# Patient Record
Sex: FEMALE | Race: WHITE | NOT HISPANIC OR LATINO
[De-identification: names, ages, dates, MRNs, and addresses within clinical notes are randomized per-mention and may not be internally consistent; named-entity substitution may affect disease eponyms.]

---

## 2017-11-09 ENCOUNTER — CHARTING TRANS (OUTPATIENT)
Dept: OTHER | Age: 37
End: 2017-11-09

## 2018-11-02 VITALS
SYSTOLIC BLOOD PRESSURE: 100 MMHG | HEART RATE: 78 BPM | WEIGHT: 160.06 LBS | BODY MASS INDEX: 26.67 KG/M2 | HEIGHT: 65 IN | DIASTOLIC BLOOD PRESSURE: 60 MMHG

## 2018-12-10 RX ORDER — DROSPIRENONE AND ETHINYL ESTRADIOL 0.03MG-3MG
KIT ORAL
Qty: 90 TABLET | Refills: 0 | Status: SHIPPED | OUTPATIENT
Start: 2018-12-10

## 2024-05-13 PROCEDURE — 88305 TISSUE EXAM BY PATHOLOGIST: CPT | Performed by: CLINICAL MEDICAL LABORATORY

## 2024-05-15 ENCOUNTER — LAB REQUISITION (OUTPATIENT)
Dept: LAB | Age: 44
End: 2024-05-15

## 2024-05-15 DIAGNOSIS — N84.0 POLYP OF CORPUS UTERI: ICD-10-CM

## 2024-05-16 LAB
ASR DISCLAIMER: NORMAL
CASE RPRT: NORMAL
CLINICAL INFO: NORMAL
PATH REPORT.FINAL DX SPEC: NORMAL
PATH REPORT.GROSS SPEC: NORMAL

## 2025-01-22 ENCOUNTER — HOSPITAL ENCOUNTER (OUTPATIENT)
Age: 45
Discharge: HOME | End: 2025-01-22
Attending: STUDENT IN AN ORGANIZED HEALTH CARE EDUCATION/TRAINING PROGRAM
Payer: COMMERCIAL

## 2025-01-22 VITALS
SYSTOLIC BLOOD PRESSURE: 130 MMHG | RESPIRATION RATE: 20 BRPM | OXYGEN SATURATION: 97 % | TEMPERATURE: 98 F | HEART RATE: 82 BPM | DIASTOLIC BLOOD PRESSURE: 86 MMHG

## 2025-01-22 DIAGNOSIS — J06.9 VIRAL URI WITH COUGH: Primary | ICD-10-CM

## 2025-01-22 DIAGNOSIS — R04.2 HEMOPTYSIS: ICD-10-CM

## 2025-01-22 PROCEDURE — 99202 OFFICE O/P NEW SF 15 MIN: CPT

## 2025-01-22 PROCEDURE — 99203 OFFICE O/P NEW LOW 30 MIN: CPT

## 2025-01-22 NOTE — ED INITIAL ASSESSMENT (HPI)
Has had cough since [place], pt is 5 months pregnant with prenatal care, saw ENT  yesterday, states she \" violently coughed \" and had bloody sputum today, no sob

## 2025-01-23 NOTE — ED PROVIDER NOTES
Patient Seen in: [facility]      History     Chief Complaint   Patient presents with    Cough     Coughing blood - 5 months pregnant - Entered by patient     Stated Complaint: Cough - Coughing blood - 5 months pregnant    Subjective:   HPI      44-year-old female who is currently 24 WGA, who presents with cough and congestion for a few weeks.  She denies any chest pain or difficulty breathing.  After a very intense coughing episode about 2.5 hours prior to arrival, she noticed blood-tinged sputum with no nasal bleeding and no further bleeding.  She has not had further cough productive of sputum but has been coughing with no further hemoptysis.  She has never had this before.  She denies any history of smoking cigarettes.  She denies any history cancer.  No previous history of DVT or PE.  She is on aspirin to prevent preeclampsia given maternal age, but no preeclampsia history.  She denies any complications with her pregnancy.  She denies any abdominal pain or vaginal bleeding.    Objective:     History reviewed. No pertinent past medical history.           History reviewed. No pertinent surgical history.             Social History     Socioeconomic History    Marital status: Single   Tobacco Use    Smoking status: Never   Vaping Use    Vaping status: Every Day   Substance and Sexual Activity    Alcohol use: Not Currently              Review of Systems    Positive for stated complaint: Cough - Coughing blood - 5 months pregnant  Other systems are as noted in HPI.  Constitutional and vital signs reviewed.      All other systems reviewed and negative except as noted above.    Physical Exam     ED Triage Vitals [01/22/25 1744]   /86   Pulse 76   Resp 20   Temp 98.3 °F (36.8 °C)   Temp src Oral   SpO2 97 %   O2 Device None (Room air)       Current Vitals:   Vital Signs  BP: 130/86  Pulse: 82  Resp: 20  Temp: 98.3 °F (36.8 °C)  Temp src: Oral    Oxygen Therapy  SpO2: 97 %  O2 Device: None (Room  air)        Physical Exam    General: Awake, alert, comfortable on room air, in no distress, tolerating oral secretions, interactive  Pulmonary: Lungs CTA B, no wheezing, no conversational dyspnea, coughs intermittently but in no distress  Neuro: Symmetrical facial expressions on gross observation  HEENT: No periorbital edema or erythema, nonerythematous and nonedematous intact B/L TMs, no erythema or edema of the B/L ear canals, nasal congestion is present, nonerythematous and nonedematous B/L tonsils, no tonsillar exudates, no peritonsillar edema, uvula midline, tolerating oral secretions, normal speech, no submandibular edema  Psych: Normal mood, normal affect    ED Course   No labs or imaging performed  MDM   Patient is awake, alert, comfortable on room air, in no distress, afebrile, lungs CTAB with no wheezing with no sign of acute bronchospasm, no sign of otitis media or otitis externa, no sign of tonsillitis or PTA or deep space infection, she does have nasal congestion and therefore consider viral URI with cough versus bronchitis versus less likely pneumonia given no fevers and no focal findings on lung exam, but has had cough for a few weeks  -We discussed most common cause for isolated episode of blood-tinged sputum is viral infection/bronchitis, which is most consistent with patient's clinical picture and exam findings.  Sputum was also blood-tinged after a very significant coughing episode per the patient which is most consistent with likely bronchial irritation.  However, did discuss possibility for pneumonia given the duration of her symptoms, but given current pregnancy, at this time she declines chest x-ray imaging. We did discuss limitations of diagnosis without imaging, but prefers to continue to monitor closely at home and agrees with any new, changing, or progressing signs or symptoms would be reassessed.  -We discussed that there can be more concerning causes for hemoptysis such as cancer and  PE, but patient has no tachycardia, no desaturation, no dyspnea, no family history of hypercoagulability, no h/o cigarette use, and her clinical course and story is most concerning for bronchial irritation/viral infection, and therefore concern that risk outweighs benefit at this time to assess for PE given patient's current pregnancy.  Therefore, no D-dimer or CT scan performed. Pt agrees with this plan.  -Patient's story and exam is not consistent with epistaxis, and no report of williams blood or massive/severe hemoptysis, and no signs of ongoing active bleeding or significant blood loss with no tachycardia and normal blood pressure.  Therefore, no indication for CBC at this time.  -Patient is to follow-up with OB/Gyn, to discuss her current prescription for aspirin, given episode of hemoptysis, but would not discontinue at this time as concerned that benefit of aspirin outweighs risk.  -Fetal heart tones 156 per nursing, this is around where patient's fetal heart tones have been per the patient.  No reported pregnancy complaints to indicate emergency department assessment at this time.  -We discussed symptomatic management for viral infections, maintaining hydration, and over-the-counter Tylenol if needed for pain or fever control.  Patient does have a list from her obstetric/gynecologist of safe over the counter medications she can take for colds while pregnant.  -We discussed that with any development of chest pain or shortness of breath or further hemoptysis, would recommend immediate assessment in the emergency department.  -With persistence of symptoms, patient is agreeable to reassessment as she may benefit from chest x-ray depending on her clinical course.  At this time, as above declines chest x-ray.    Medical Decision Making  Risk  OTC drugs.        Disposition and Plan     Clinical Impression:  1. Viral URI with cough    2. Hemoptysis         Disposition:  Discharge  1/22/2025  6:34  pm    Follow-up:    Primary Care and OB/Gyn      Medications Prescribed:  Discharge Medication List as of 1/22/2025  6:36 PM          None

## 2025-01-23 NOTE — DISCHARGE INSTRUCTIONS
You had blood-tinged sputum in the setting of viral-like symptoms consistent with most likely to be caused by bronchitis which is inflammation in the setting of viral infection.  As we discussed, this can also occur in the setting of pneumonia, but would need to perform chest x-ray to better assess for pneumonia which you declined at this time.  We also discussed more severe and concerning causes which are less likely given a single isolated minor episode and no risk factors.  However, if symptoms persist, or if you develop more blood in the sputum, or if you develop chest pain or shortness of breath, I recommend immediate assessment in the emergency department.    Please consult with your obstetrics/gynecologist to ensure that they are okay with you continuing aspirin.